# Patient Record
Sex: FEMALE | Race: WHITE | URBAN - METROPOLITAN AREA
[De-identification: names, ages, dates, MRNs, and addresses within clinical notes are randomized per-mention and may not be internally consistent; named-entity substitution may affect disease eponyms.]

---

## 2023-01-01 ENCOUNTER — HOSPITAL ENCOUNTER (EMERGENCY)
Age: 28
Discharge: HOME | End: 2023-01-01
Attending: EMERGENCY MEDICINE
Payer: MEDICAID

## 2023-01-01 VITALS
DIASTOLIC BLOOD PRESSURE: 80 MMHG | RESPIRATION RATE: 16 BRPM | WEIGHT: 135 LBS | OXYGEN SATURATION: 99 % | HEIGHT: 66 IN | HEART RATE: 74 BPM | TEMPERATURE: 98.1 F | BODY MASS INDEX: 21.69 KG/M2 | SYSTOLIC BLOOD PRESSURE: 127 MMHG

## 2023-01-01 DIAGNOSIS — K08.89 PAIN, DENTAL: Primary | ICD-10-CM

## 2023-01-01 PROCEDURE — 99283 EMERGENCY DEPT VISIT LOW MDM: CPT

## 2023-01-01 RX ORDER — PENICILLIN V POTASSIUM 500 MG/1
500 TABLET ORAL 4 TIMES DAILY
Qty: 40 TABLET | Refills: 0 | Status: SHIPPED | OUTPATIENT
Start: 2023-01-01 | End: 2023-01-11

## 2023-01-01 ASSESSMENT — ENCOUNTER SYMPTOMS
SHORTNESS OF BREATH: 0
RHINORRHEA: 0
ABDOMINAL PAIN: 0
WHEEZING: 0
VOMITING: 0
SORE THROAT: 0
SINUS PAIN: 0
FACIAL SWELLING: 0
NAUSEA: 0
SINUS PRESSURE: 0
COUGH: 0
DIARRHEA: 0
BACK PAIN: 0

## 2023-01-01 ASSESSMENT — PAIN DESCRIPTION - ORIENTATION: ORIENTATION: RIGHT

## 2023-01-01 ASSESSMENT — PAIN DESCRIPTION - FREQUENCY: FREQUENCY: CONTINUOUS

## 2023-01-01 ASSESSMENT — PAIN DESCRIPTION - LOCATION: LOCATION: MOUTH;TEETH

## 2023-01-01 ASSESSMENT — PAIN DESCRIPTION - DIRECTION: RADIATING_TOWARDS: NECK AND HEAD

## 2023-01-01 ASSESSMENT — PAIN DESCRIPTION - PAIN TYPE: TYPE: ACUTE PAIN

## 2023-01-01 ASSESSMENT — PAIN SCALES - GENERAL: PAINLEVEL_OUTOF10: 6

## 2023-01-01 ASSESSMENT — PAIN DESCRIPTION - DESCRIPTORS: DESCRIPTORS: ACHING;SHARP;SHOOTING

## 2023-01-01 NOTE — DISCHARGE INSTRUCTIONS
Return to the emergency department if symptoms worsen or persist.  Keep your scheduled appointment January 11 with a dentist for a root canal.  Take the prescribed antibiotic as written. Tylenol and/or Motrin for pain.

## 2023-01-01 NOTE — ED PROVIDER NOTES
[address] [facility] Emergency Department  [id number] [address] [facility] [address] [facility] [place] [address]  Phone: [phone]  Fax: [phone]    eMERGENCY dEPARTMENT eNCOUnter        Pt Name: Krystle Rivera  MRN: [id number]  [place] 1995  Date of evaluation: 1/1/23    CHIEF COMPLAINT     Chief Complaint   Patient presents with    Dental Pain     SAW DENTIST 2 DAYS AGO. THEY RECOMMENDED ROOT CANAL. PAIN RADIATING INTO NECK AND HEAD       HISTORY OF PRESENT ILLNESS    Krystle Rivera is a 32 y.o. female who presents to the emergency department accompanied by her sister with concerns of ongoing dental pain. The patient stated she just recently moved here out of town. Her aunt happens to be local orthodontist.  She was able to get the patient in last week for consultation with a dentist downtown unknown dentist name. She is scheduled January 11 to have a root canal.  She has been taking Tylenol Motrin for pain. They did not place her on antibiotic. She states that the pain is intensified. She feels that her lymph gland is a little swollen on that side. She denies any fever chills cough congestion or any flulike symptoms. She is able to open and close her mouth. REVIEW OF SYSTEMS     Review of Systems   Constitutional:  Negative for chills and fever. HENT:  Positive for dental problem. Negative for congestion, ear pain, facial swelling, mouth sores, postnasal drip, rhinorrhea, sinus pressure, sinus pain, sneezing and sore throat. Respiratory:  Negative for cough, shortness of breath and wheezing. Cardiovascular:  Negative for chest pain, palpitations and leg swelling. Gastrointestinal:  Negative for abdominal pain, diarrhea, nausea and vomiting. Genitourinary:  Negative for dysuria, flank pain, frequency and hematuria. Musculoskeletal:  Negative for back pain. Skin:  Negative for rash. Neurological:  Negative for dizziness, light-headedness, numbness and headaches.      PAST MEDICAL HISTORY    has no past medical history on file. SURGICAL HISTORY      has no past surgical history on file. CURRENT MEDICATIONS       Discharge Medication List as of 1/1/2023  4:44 PM          ALLERGIES     has no allergies on file. FAMILY HISTORY     has no family status information on file. family history is not on file. SOCIAL HISTORY          PHYSICAL EXAM     INITIAL VITALS:  height is 5' 6\" (1.676 m) and weight is 61.2 kg (135 lb). Her oral temperature is 98.1 °F (36.7 °C). Her blood pressure is 127/80 and her pulse is 74. Her respiration is 16 and oxygen saturation is 99%. Physical Exam  Vitals and nursing note reviewed. Constitutional:       Appearance: Normal appearance. HENT:      Head: Normocephalic and atraumatic. Jaw: Tenderness present. No trismus, swelling, pain on movement or malocclusion. Comments: Right bottomw 2nd to last molar with dental caries extensive that has caused the tooth to be deformed. No anug; no abscess  No facial swelling or edema  No facial redness     Nose: Nose normal.      Mouth/Throat:      Mouth: Mucous membranes are moist.   Eyes:      Extraocular Movements: Extraocular movements intact. Pupils: Pupils are equal, round, and reactive to light. Cardiovascular:      Rate and Rhythm: Normal rate and regular rhythm. Pulses: Normal pulses. Heart sounds: Normal heart sounds. Pulmonary:      Effort: Pulmonary effort is normal.      Breath sounds: Normal breath sounds. Abdominal:      General: Abdomen is flat. Bowel sounds are normal.      Palpations: Abdomen is soft. Comments: No pulsatile mass   Musculoskeletal:         General: Normal range of motion. Cervical back: Normal range of motion and neck supple. Skin:     General: Skin is warm and dry. Neurological:      General: No focal deficit present. Mental Status: She is alert and oriented to person, place, and time. Mental status is at baseline. Psychiatric:         Mood and Affect: Mood normal.         Behavior: Behavior normal.     DIFFERENTIAL DIAGNOSIS / MDM / EMERGENCY DEPARTMENT COURSE:     The patient was seen and evaluated by myself. She is not toxic or septic. She has 1 tooth that is extensively decayed because of the cavity. This is a when she scheduled with the dental specialist January 11 to have a root canal.  She is just concerned because she is not on an antibiotic. The pain still persist.  I will place her on Pen-Vee K with strict instruction when to return. She was also informed that she needs to keep this appointment this dentist and to continue with the Tylenol as needed and/or ibuprofen for pain. The patient feels very comfortable disposition treatment plan above. DIAGNOSTIC RESULTS     LABS:  No results found for this visit on 01/01/23. All other labs were within normal range or not returned as of this dictation. RADIOLOGY:  No orders to display       I have reviewed the disposition diagnosis with the patient and or their family/guardian. I have answered their questions and givendischarge instructions. They voiced understanding of these instructions and did not have any further questions or complaints. PROCEDURES:  Unless otherwise noted below, none     Procedures    FINAL IMPRESSION      1. Pain, dental          DISPOSITION/PLAN   DISPOSITION Decision To Discharge 01/01/2023 04:33:51 PM      PATIENT REFERRED TO:  No follow-up provider specified.     DISCHARGE MEDICATIONS:  Discharge Medication List as of 1/1/2023  4:44 PM        START taking these medications    Details   penicillin v potassium (VEETID) 500 MG tablet Take 1 tablet by mouth 4 times daily for 10 days, Disp-40 tablet, R-0Print                (Please note that portions of this note were completed with a voice recognition program.  Efforts were made to edit the dictations but occasionally words are mis-transcribed.)    Sandip Sanders ,(electronically signed)  Board Certified Emergency Physician       Martha Hutchinson DO  01/01/23 2049